# Patient Record
Sex: MALE | Employment: OTHER | URBAN - METROPOLITAN AREA
[De-identification: names, ages, dates, MRNs, and addresses within clinical notes are randomized per-mention and may not be internally consistent; named-entity substitution may affect disease eponyms.]

---

## 2019-06-09 ENCOUNTER — HOSPITAL ENCOUNTER (EMERGENCY)
Facility: HOSPITAL | Age: 59
Discharge: HOME | End: 2019-06-10
Attending: EMERGENCY MEDICINE
Payer: MEDICARE

## 2019-06-09 VITALS
DIASTOLIC BLOOD PRESSURE: 53 MMHG | HEIGHT: 68 IN | HEART RATE: 86 BPM | BODY MASS INDEX: 20.36 KG/M2 | TEMPERATURE: 99 F | SYSTOLIC BLOOD PRESSURE: 103 MMHG | OXYGEN SATURATION: 97 % | WEIGHT: 134.38 LBS | RESPIRATION RATE: 12 BRPM

## 2019-06-09 DIAGNOSIS — G89.29 CHRONIC MIDLINE LOW BACK PAIN WITHOUT SCIATICA: ICD-10-CM

## 2019-06-09 DIAGNOSIS — I95.9 HYPOTENSION: ICD-10-CM

## 2019-06-09 DIAGNOSIS — E86.0 DEHYDRATION: ICD-10-CM

## 2019-06-09 DIAGNOSIS — F10.920 ALCOHOLIC INTOXICATION WITHOUT COMPLICATION: Primary | ICD-10-CM

## 2019-06-09 DIAGNOSIS — R00.1 BRADYCARDIA: ICD-10-CM

## 2019-06-09 DIAGNOSIS — M54.50 CHRONIC MIDLINE LOW BACK PAIN WITHOUT SCIATICA: ICD-10-CM

## 2019-06-09 DIAGNOSIS — R93.89 ABNORMAL CT SCAN: ICD-10-CM

## 2019-06-09 LAB
ALBUMIN SERPL BCP-MCNC: 2.9 G/DL (ref 3.5–5.2)
ALP SERPL-CCNC: 68 U/L (ref 55–135)
ALT SERPL W/O P-5'-P-CCNC: 21 U/L (ref 10–44)
AMPHET+METHAMPHET UR QL: NEGATIVE
ANION GAP SERPL CALC-SCNC: 13 MMOL/L (ref 8–16)
APAP SERPL-MCNC: <3 UG/ML (ref 10–20)
AST SERPL-CCNC: 51 U/L (ref 10–40)
BARBITURATES UR QL SCN>200 NG/ML: NEGATIVE
BASOPHILS # BLD AUTO: 0.05 K/UL (ref 0–0.2)
BASOPHILS NFR BLD: 1.2 % (ref 0–1.9)
BENZODIAZ UR QL SCN>200 NG/ML: NEGATIVE
BILIRUB SERPL-MCNC: 0.3 MG/DL (ref 0.1–1)
BILIRUB UR QL STRIP: NEGATIVE
BNP SERPL-MCNC: 13 PG/ML (ref 0–99)
BUN SERPL-MCNC: 4 MG/DL (ref 6–20)
BZE UR QL SCN: NEGATIVE
CALCIUM SERPL-MCNC: 7.9 MG/DL (ref 8.7–10.5)
CANNABINOIDS UR QL SCN: NEGATIVE
CHLORIDE SERPL-SCNC: 107 MMOL/L (ref 95–110)
CLARITY UR: CLEAR
CO2 SERPL-SCNC: 21 MMOL/L (ref 23–29)
COLOR UR: YELLOW
CREAT SERPL-MCNC: 0.7 MG/DL (ref 0.5–1.4)
CREAT UR-MCNC: 30.5 MG/DL (ref 23–375)
CRP SERPL-MCNC: 0.2 MG/L (ref 0–8.2)
DIFFERENTIAL METHOD: ABNORMAL
EOSINOPHIL # BLD AUTO: 0.1 K/UL (ref 0–0.5)
EOSINOPHIL NFR BLD: 2.3 % (ref 0–8)
ERYTHROCYTE [DISTWIDTH] IN BLOOD BY AUTOMATED COUNT: 13.8 % (ref 11.5–14.5)
ERYTHROCYTE [SEDIMENTATION RATE] IN BLOOD BY WESTERGREN METHOD: 4 MM/HR (ref 0–10)
EST. GFR  (AFRICAN AMERICAN): >60 ML/MIN/1.73 M^2
EST. GFR  (NON AFRICAN AMERICAN): >60 ML/MIN/1.73 M^2
ETHANOL SERPL-MCNC: 280 MG/DL
GLUCOSE SERPL-MCNC: 118 MG/DL (ref 70–110)
GLUCOSE UR QL STRIP: NEGATIVE
HCT VFR BLD AUTO: 42.1 % (ref 40–54)
HGB BLD-MCNC: 14.4 G/DL (ref 14–18)
HGB UR QL STRIP: NEGATIVE
KETONES UR QL STRIP: NEGATIVE
LACTATE SERPL-SCNC: 2.1 MMOL/L (ref 0.5–2.2)
LEUKOCYTE ESTERASE UR QL STRIP: NEGATIVE
LIPASE SERPL-CCNC: 32 U/L (ref 4–60)
LYMPHOCYTES # BLD AUTO: 2.2 K/UL (ref 1–4.8)
LYMPHOCYTES NFR BLD: 50.3 % (ref 18–48)
MAGNESIUM SERPL-MCNC: 1.8 MG/DL (ref 1.6–2.6)
MCH RBC QN AUTO: 33.6 PG (ref 27–31)
MCHC RBC AUTO-ENTMCNC: 34.2 G/DL (ref 32–36)
MCV RBC AUTO: 98 FL (ref 82–98)
METHADONE UR QL SCN>300 NG/ML: NEGATIVE
MONOCYTES # BLD AUTO: 0.3 K/UL (ref 0.3–1)
MONOCYTES NFR BLD: 6.2 % (ref 4–15)
NEUTROPHILS # BLD AUTO: 1.7 K/UL (ref 1.8–7.7)
NEUTROPHILS NFR BLD: 40 % (ref 38–73)
NITRITE UR QL STRIP: NEGATIVE
OPIATES UR QL SCN: NEGATIVE
PCP UR QL SCN>25 NG/ML: NEGATIVE
PH UR STRIP: 6 [PH] (ref 5–8)
PLATELET # BLD AUTO: 134 K/UL (ref 150–350)
PMV BLD AUTO: 11.1 FL (ref 9.2–12.9)
POTASSIUM SERPL-SCNC: 3.5 MMOL/L (ref 3.5–5.1)
PROT SERPL-MCNC: 5.9 G/DL (ref 6–8.4)
PROT UR QL STRIP: NEGATIVE
RBC # BLD AUTO: 4.29 M/UL (ref 4.6–6.2)
SALICYLATES SERPL-MCNC: 15.5 MG/DL (ref 15–30)
SODIUM SERPL-SCNC: 141 MMOL/L (ref 136–145)
SP GR UR STRIP: <=1.005 (ref 1–1.03)
TOXICOLOGY INFORMATION: NORMAL
TROPONIN I SERPL DL<=0.01 NG/ML-MCNC: 0.02 NG/ML (ref 0–0.03)
TROPONIN I SERPL DL<=0.01 NG/ML-MCNC: 0.03 NG/ML (ref 0–0.03)
TSH SERPL DL<=0.005 MIU/L-ACNC: 0.66 UIU/ML (ref 0.4–4)
URN SPEC COLLECT METH UR: ABNORMAL
UROBILINOGEN UR STRIP-ACNC: NEGATIVE EU/DL
WBC # BLD AUTO: 4.33 K/UL (ref 3.9–12.7)

## 2019-06-09 PROCEDURE — 84484 ASSAY OF TROPONIN QUANT: CPT

## 2019-06-09 PROCEDURE — 84484 ASSAY OF TROPONIN QUANT: CPT | Mod: 91

## 2019-06-09 PROCEDURE — 81003 URINALYSIS AUTO W/O SCOPE: CPT | Mod: 59

## 2019-06-09 PROCEDURE — 93010 ELECTROCARDIOGRAM REPORT: CPT | Mod: ,,, | Performed by: INTERNAL MEDICINE

## 2019-06-09 PROCEDURE — 80307 DRUG TEST PRSMV CHEM ANLYZR: CPT

## 2019-06-09 PROCEDURE — 83605 ASSAY OF LACTIC ACID: CPT

## 2019-06-09 PROCEDURE — 85651 RBC SED RATE NONAUTOMATED: CPT

## 2019-06-09 PROCEDURE — 84443 ASSAY THYROID STIM HORMONE: CPT

## 2019-06-09 PROCEDURE — 83735 ASSAY OF MAGNESIUM: CPT

## 2019-06-09 PROCEDURE — 83880 ASSAY OF NATRIURETIC PEPTIDE: CPT

## 2019-06-09 PROCEDURE — 25000003 PHARM REV CODE 250: Performed by: EMERGENCY MEDICINE

## 2019-06-09 PROCEDURE — 86140 C-REACTIVE PROTEIN: CPT

## 2019-06-09 PROCEDURE — 83690 ASSAY OF LIPASE: CPT

## 2019-06-09 PROCEDURE — 80329 ANALGESICS NON-OPIOID 1 OR 2: CPT

## 2019-06-09 PROCEDURE — 93005 ELECTROCARDIOGRAM TRACING: CPT

## 2019-06-09 PROCEDURE — 93010 EKG 12-LEAD: ICD-10-PCS | Mod: ,,, | Performed by: INTERNAL MEDICINE

## 2019-06-09 PROCEDURE — 96361 HYDRATE IV INFUSION ADD-ON: CPT

## 2019-06-09 PROCEDURE — 80320 DRUG SCREEN QUANTALCOHOLS: CPT

## 2019-06-09 PROCEDURE — 80053 COMPREHEN METABOLIC PANEL: CPT

## 2019-06-09 PROCEDURE — 99285 EMERGENCY DEPT VISIT HI MDM: CPT | Mod: 25

## 2019-06-09 PROCEDURE — 36415 COLL VENOUS BLD VENIPUNCTURE: CPT

## 2019-06-09 PROCEDURE — 25500020 PHARM REV CODE 255: Performed by: EMERGENCY MEDICINE

## 2019-06-09 PROCEDURE — 85025 COMPLETE CBC W/AUTO DIFF WBC: CPT

## 2019-06-09 PROCEDURE — 96360 HYDRATION IV INFUSION INIT: CPT

## 2019-06-09 RX ORDER — PANTOPRAZOLE SODIUM 40 MG/1
40 TABLET, DELAYED RELEASE ORAL DAILY
COMMUNITY

## 2019-06-09 RX ORDER — THIAMINE HCL 100 MG
100 TABLET ORAL DAILY
Status: DISCONTINUED | OUTPATIENT
Start: 2019-06-10 | End: 2019-06-09

## 2019-06-09 RX ORDER — THIAMINE HCL 100 MG
100 TABLET ORAL DAILY
Status: DISCONTINUED | OUTPATIENT
Start: 2019-06-09 | End: 2019-06-10 | Stop reason: HOSPADM

## 2019-06-09 RX ORDER — TIZANIDINE 4 MG/1
4 TABLET ORAL EVERY 6 HOURS PRN
COMMUNITY

## 2019-06-09 RX ADMIN — SODIUM CHLORIDE, SODIUM LACTATE, POTASSIUM CHLORIDE, AND CALCIUM CHLORIDE 1000 ML: .6; .31; .03; .02 INJECTION, SOLUTION INTRAVENOUS at 05:06

## 2019-06-09 RX ADMIN — Medication 100 MG: at 07:06

## 2019-06-09 RX ADMIN — SODIUM CHLORIDE 1000 ML: 0.9 INJECTION, SOLUTION INTRAVENOUS at 03:06

## 2019-06-09 RX ADMIN — IOHEXOL 100 ML: 350 INJECTION, SOLUTION INTRAVENOUS at 05:06

## 2019-06-09 NOTE — ED NOTES
Pt resting comfortably in ED bed in no acute distress. Vital signs stable. Respirations even and unlabored. Bed in lowest

## 2019-06-09 NOTE — ED PROVIDER NOTES
SCRIBE #1 NOTE: I, Jaren Flores, am scribing for, and in the presence of, Gerard Ward MD. I have scribed the HPI, ROS, and PEx.     SCRIBE #2 NOTE: I, Ginger Chaney, am scribing for, and in the presence of,  Prem Pedro MD. I have scribed the remaining portions of the note not scribed by Scribe #1.     History      Chief Complaint   Patient presents with    Back Pain     back pain, hypotensive en route       Review of patient's allergies indicates:  No Known Allergies     HPI   HPI    6/9/2019, 2:34 PM   History obtained from the patient and [facility]      History of Present Illness: Hugh Suggs is a 59 y.o. male patient who presents to the Emergency Department for lower back pain, onset several years PTA. Symptoms are constant and moderate in severity. No mitigating or exacerbating factors reported. Associated sxs include gait problem. Patient denies any fever, chills, n/v, SOB, CP, weakness, numbness, dizziness, headache, and all other sxs at this time. Prior Tx includes Zanaflex, with no improvement. [facility] states that the pt had combined Zanaflex with several beers at home, and was hypotensive en route to the ED. No further complaints or concerns at this time.       Arrival mode: [facility]    PCP: SWAPNA Queen       Past Medical History:  History reviewed. No pertinent medical history.     Past Surgical History:  History reviewed. No pertinent surgical history.     Family History:  History reviewed. No pertinent family history.     Social History:  Social History     Tobacco Use    Smoking status: Unknown   Substance and Sexual Activity    Alcohol use: Unknown    Drug use: Unknown    Sexual activity: Unknown     ROS   Review of Systems   Constitutional: Negative for chills, diaphoresis, fatigue and fever.   HENT: Negative for sore throat.    Respiratory: Negative for shortness of breath.    Cardiovascular: Negative for chest pain.   Gastrointestinal: Negative for abdominal pain and nausea.    Genitourinary: Negative for dysuria.   Musculoskeletal: Positive for back pain (lower) and gait problem.   Skin: Negative for rash and wound.   Neurological: Negative for dizziness, weakness, light-headedness, numbness and headaches.   Hematological: Does not bruise/bleed easily.   All other systems reviewed and are negative.    Physical Exam      Initial Vitals   BP Pulse Resp Temp SpO2   06/09/19 1435 06/09/19 1435 06/09/19 1435 06/09/19 1441 06/09/19 1435   (!) 96/49 64 18 98.6 °F (37 °C) 98 %      MAP       --                 Physical Exam  Nursing Notes and Vital Signs Reviewed.  Constitutional: Patient is in no acute distress. Well-developed and well-nourished.  Head: Atraumatic. Normocephalic.  Eyes: PERRL. EOM intact. Conjunctivae are not pale. No scleral icterus.  ENT: Mucous membranes are moist. Oropharynx is clear and symmetric.    Neck: Supple. Full ROM. No lymphadenopathy.  Cardiovascular: Regular rate. Regular rhythm. No murmurs, rubs, or gallops. Distal pulses are 2+ and symmetric.  Pulmonary/Chest: No respiratory distress. Clear to auscultation bilaterally. No wheezing or rales.  Abdominal: Soft and non-distended.  There is no tenderness.  No rebound, guarding, or rigidity. Good bowel sounds.  Genitourinary: No CVA tenderness  Musculoskeletal: Moves all extremities. No obvious deformities. No edema. No calf tenderness.  Back: No midline bony tenderness, deformities, or step-offs of the T-spine or L-spine. Skin appears normal without abrasions or bruising. No erythema, induration, or fluctuance.   Skin: Warm and dry.  Neurological:  Alert, awake, and appropriate.  Normal speech.  No acute focal neurological deficits are appreciated.  Psychiatric: Normal affect. Good eye contact. Appropriate in content.    ED Course    Procedures  ED Vital Signs:  Vitals:    06/09/19 1454 06/09/19 1514 06/09/19 1515 06/09/19 1541   BP: (!) 84/49   (S) (!) 77/53   Pulse: 64   (!) 57   Resp: 17   14   Temp:      "  TempSrc:       SpO2: 96%   97%   Weight:  61 kg (134 lb 6.4 oz)     Height:   5' 8" (1.727 m)     06/09/19 1601 06/09/19 1616 06/09/19 1631 06/09/19 1727   BP: (!) 81/52 (!) 73/49 96/62    Pulse: (!) 59 64 (!) 56 (S) (!) 47   Resp: 18 19 17 16   Temp:       TempSrc:       SpO2: 98% (!) 94%  100%   Weight:       Height:        06/09/19 1731 06/09/19 1749 06/09/19 1801 06/09/19 1836   BP: 99/60 94/61 (!) 97/57 (!) 99/57   Pulse: (!) 46 (!) 56 (!) 52 (!) 55   Resp: 14  16 16   Temp:       TempSrc:       SpO2: 98% (!) 94% 97% 99%   Weight:       Height:        06/09/19 1931 06/09/19 2047 06/09/19 2048   BP: (!) 102/56 (!) 103/53    Pulse: (!) 51  86   Resp: 12     Temp:      TempSrc:      SpO2: 95% 97%    Weight:      Height:          Abnormal Lab Results:  Labs Reviewed   CBC W/ AUTO DIFFERENTIAL - Abnormal; Notable for the following components:       Result Value    RBC 4.29 (*)     Mean Corpuscular Hemoglobin 33.6 (*)     Platelets 134 (*)     Gran # (ANC) 1.7 (*)     Lymph% 50.3 (*)     All other components within normal limits   COMPREHENSIVE METABOLIC PANEL - Abnormal; Notable for the following components:    CO2 21 (*)     Glucose 118 (*)     BUN, Bld 4 (*)     Calcium 7.9 (*)     Total Protein 5.9 (*)     Albumin 2.9 (*)     AST 51 (*)     All other components within normal limits   URINALYSIS, REFLEX TO URINE CULTURE - Abnormal; Notable for the following components:    Specific Gravity, UA <=1.005 (*)     All other components within normal limits    Narrative:     Preferred Collection Type->Urine, Clean Catch   ALCOHOL,MEDICAL (ETHANOL) - Abnormal; Notable for the following components:    Alcohol, Medical, Serum 280 (*)     All other components within normal limits   ACETAMINOPHEN LEVEL - Abnormal; Notable for the following components:    Acetaminophen (Tylenol), Serum <3.0 (*)     All other components within normal limits   TROPONIN I - Abnormal; Notable for the following components:    Troponin I 0.028 (*)  "    All other components within normal limits   TSH   DRUG SCREEN PANEL, URINE EMERGENCY    Narrative:     Preferred Collection Type->Urine, Clean Catch   SALICYLATE LEVEL   LACTIC ACID, PLASMA   LIPASE   C-REACTIVE PROTEIN   SEDIMENTATION RATE   TROPONIN I   B-TYPE NATRIURETIC PEPTIDE   TSH   MAGNESIUM   SEDIMENTATION RATE   LIPASE   C-REACTIVE PROTEIN   MAGNESIUM   B-TYPE NATRIURETIC PEPTIDE   TROPONIN I        All Lab Results:  Results for orders placed or performed during the hospital encounter of 06/09/19   CBC auto differential   Result Value Ref Range    WBC 4.33 3.90 - 12.70 K/uL    RBC 4.29 (L) 4.60 - 6.20 M/uL    Hemoglobin 14.4 14.0 - 18.0 g/dL    Hematocrit 42.1 40.0 - 54.0 %    Mean Corpuscular Volume 98 82 - 98 fL    Mean Corpuscular Hemoglobin 33.6 (H) 27.0 - 31.0 pg    Mean Corpuscular Hemoglobin Conc 34.2 32.0 - 36.0 g/dL    RDW 13.8 11.5 - 14.5 %    Platelets 134 (L) 150 - 350 K/uL    MPV 11.1 9.2 - 12.9 fL    Gran # (ANC) 1.7 (L) 1.8 - 7.7 K/uL    Lymph # 2.2 1.0 - 4.8 K/uL    Mono # 0.3 0.3 - 1.0 K/uL    Eos # 0.1 0.0 - 0.5 K/uL    Baso # 0.05 0.00 - 0.20 K/uL    Gran% 40.0 38.0 - 73.0 %    Lymph% 50.3 (H) 18.0 - 48.0 %    Mono% 6.2 4.0 - 15.0 %    Eosinophil% 2.3 0.0 - 8.0 %    Basophil% 1.2 0.0 - 1.9 %    Differential Method Automated    Comprehensive metabolic panel   Result Value Ref Range    Sodium 141 136 - 145 mmol/L    Potassium 3.5 3.5 - 5.1 mmol/L    Chloride 107 95 - 110 mmol/L    CO2 21 (L) 23 - 29 mmol/L    Glucose 118 (H) 70 - 110 mg/dL    BUN, Bld 4 (L) 6 - 20 mg/dL    Creatinine 0.7 0.5 - 1.4 mg/dL    Calcium 7.9 (L) 8.7 - 10.5 mg/dL    Total Protein 5.9 (L) 6.0 - 8.4 g/dL    Albumin 2.9 (L) 3.5 - 5.2 g/dL    Total Bilirubin 0.3 0.1 - 1.0 mg/dL    Alkaline Phosphatase 68 55 - 135 U/L    AST 51 (H) 10 - 40 U/L    ALT 21 10 - 44 U/L    Anion Gap 13 8 - 16 mmol/L    eGFR if African American >60 >60 mL/min/1.73 m^2    eGFR if non African American >60 >60 mL/min/1.73 m^2   Urinalysis,  Reflex to Urine Culture Urine, Clean Catch   Result Value Ref Range    Specimen UA Urine, Clean Catch     Color, UA Yellow Yellow, Straw, Patricia    Appearance, UA Clear Clear    pH, UA 6.0 5.0 - 8.0    Specific Gravity, UA <=1.005 (A) 1.005 - 1.030    Protein, UA Negative Negative    Glucose, UA Negative Negative    Ketones, UA Negative Negative    Bilirubin (UA) Negative Negative    Occult Blood UA Negative Negative    Nitrite, UA Negative Negative    Urobilinogen, UA Negative <2.0 EU/dL    Leukocytes, UA Negative Negative   TSH   Result Value Ref Range    TSH 0.661 0.400 - 4.000 uIU/mL   Drug screen panel, emergency   Result Value Ref Range    Benzodiazepines Negative     Methadone metabolites Negative     Cocaine (Metab.) Negative     Opiate Scrn, Ur Negative     Barbiturate Screen, Ur Negative     Amphetamine Screen, Ur Negative     THC Negative     Phencyclidine Negative     Creatinine, Random Ur 30.5 23.0 - 375.0 mg/dL    Toxicology Information SEE COMMENT    Ethanol   Result Value Ref Range    Alcohol, Medical, Serum 280 (H) <10 mg/dL   Salicylate level   Result Value Ref Range    Salicylate Lvl 15.5 15.0 - 30.0 mg/dL   Acetaminophen level   Result Value Ref Range    Acetaminophen (Tylenol), Serum <3.0 (L) 10.0 - 20.0 ug/mL   Lactic acid, plasma   Result Value Ref Range    Lactate (Lactic Acid) 2.1 0.5 - 2.2 mmol/L   Sedimentation rate   Result Value Ref Range    Sed Rate 4 0 - 10 mm/Hr   Lipase   Result Value Ref Range    Lipase 32 4 - 60 U/L   C-reactive protein   Result Value Ref Range    CRP 0.2 0.0 - 8.2 mg/L   Magnesium   Result Value Ref Range    Magnesium 1.8 1.6 - 2.6 mg/dL   Troponin I   Result Value Ref Range    Troponin I 0.028 (H) 0.000 - 0.026 ng/mL   Brain natriuretic peptide   Result Value Ref Range    BNP 13 0 - 99 pg/mL   Troponin I   Result Value Ref Range    Troponin I 0.018 0.000 - 0.026 ng/mL       Imaging Results:  Imaging Results          US Abdomen Limited (Final result)  Result time  06/09/19 20:09:33    Final result by Virginia Yanez MD (06/09/19 20:09:33)                 Impression:      Cholelithiasis with a thickened gallbladder wall.  This can be seen with acute cholecystitis.  Please clinically correlate.    Fatty liver.      Electronically signed by: Virginia Yanez  Date:    06/09/2019  Time:    20:09             Narrative:    EXAMINATION:  US ABDOMEN LIMITED    CLINICAL HISTORY:  abnormal CT scan;    TECHNIQUE:  Limited ultrasound of the right upper quadrant of the abdomen (including pancreas, liver, gallbladder, common bile duct, and spleen) was performed.    COMPARISON:  None.    FINDINGS:  Liver: Normal in size. Increased homogeneous echotexture no focal hepatic lesions.    Gallbladder: The gallbladder contains stones and sludge.  Its wall is mildly thickened to 4 mm.    Biliary system: The common duct is not dilated, measuring 2 mm.  No intrahepatic ductal dilatation.    Miscellaneous: There is mild perihepatic ascites.                               CTA Abdomen (Final result)  Result time 06/09/19 18:07:56    Final result by Virginia Yanez MD (06/09/19 18:07:56)                 Impression:      Distended gallbladder with a questionable thickened wall and pericholecystic inflammatory change.  Further evaluation with ultrasound is recommended.    No evidence of aortic aneurysm or dissection.    Right adrenal nodule statistically likely represents a benign adenoma.    All CT scans at this facility use dose modulation, iterative reconstruction and/or weight based dosing when appropriate to reduce radiation dose to as low as reasonably achievable.      Electronically signed by: Virginia Yanez  Date:    06/09/2019  Time:    18:07             Narrative:    EXAMINATION:  CTA ABDOMEN    CLINICAL HISTORY:  Abd mass, pulsatile, AAA suspected;back pain and hypotension;    TECHNIQUE:  Technique: Axial CT imaging was performed through the abdomen with following  intravenous contrast. Multiplanar reformats were performed and interpreted.  3D reconstructed MIPS images were also created on    COMPARISON:  None    FINDINGS:  Lung bases are clear.    There is no evidence of abdominal aneurysm or dissection.  Aortic branches are patent with no areas of significant stenosis.    There is a 1.5 cm round right adrenal mass.  The liver, spleen, kidneys, adrenal glands, and pancreas are normal. The gallbladder is distended with an enhancing wall that measures approximately 5 mm.  There are inflammatory changes surrounding the gallbladder.No free fluid, free air, or inflammatory change.  The bowel is nondistended and within normal limits.The abdominal aorta is normal in caliber. The urinary bladder is unremarkable. No significant osseous abnormality is identified.                               X-Ray Chest AP Portable (Final result)  Result time 06/09/19 17:52:03    Final result by Virginia Yanez MD (06/09/19 17:52:03)                 Impression:      No cardiopulmonary process noted.      Electronically signed by: Virginia Yanez  Date:    06/09/2019  Time:    17:52             Narrative:    EXAMINATION:  XR CHEST AP PORTABLE    CLINICAL HISTORY:  Hypotension, unspecified    COMPARISON:  No priors    FINDINGS:  No infiltrate or effusion identified.  Cardiomediastinal silhouette is within normal limits.                               X-Ray Lumbar Spine Ap And Lateral (Final result)  Result time 06/09/19 15:14:55    Final result by Virginia Yanez MD (06/09/19 15:14:55)                 Impression:      Unremarkable exam.      Electronically signed by: Virginia Yanez  Date:    06/09/2019  Time:    15:14             Narrative:    EXAMINATION:  XR LUMBAR SPINE AP AND LATERAL    CLINICAL HISTORY:  Low back pain, >6wks conservative tx, persistent-progressive sx, surgical candidate;    COMPARISON:  None    FINDINGS:  Alignment is normal.  Vertebral body heights and  disc spaces re preserved.  Pedicles have a normal, symmetric appearance.                                    The EKG was ordered, reviewed, and independently interpreted by the ED provider.  Interpretation time: 1730  Rate: 47 BPM  Rhythm: sinus bradycardia  Interpretation: Prolonged QT. No STEMI.      The Emergency Provider reviewed the vital signs and test results, which are outlined above.    ED Discussion     4:02 PM: Dr. Ward transfers care of pt to Dr. Pedro pending lab results.    8:43 PM: Reassessed pt at this time. Pt's BP has improved. Discussed with pt all pertinent ED information and results. Discussed pt dx and plan of tx. Gave pt all f/u and return to the ED instructions. All questions and concerns were addressed at this time. Pt expresses understanding of information and instructions, and is comfortable with plan to discharge. Pt is stable for discharge.    I discussed with patient and/or family/caretaker that evaluation in the ED does not suggest any emergent or life threatening medical conditions requiring immediate intervention beyond what was provided in the ED, and I believe patient is safe for discharge.  Regardless, an unremarkable evaluation in the ED does not preclude the development or presence of a serious of life threatening condition. As such, patient was instructed to return immediately for any worsening or change in current symptoms.      ED Medication(s):  Medications   thiamine tablet 100 mg (100 mg Oral Given 6/9/19 1947)   sodium chloride 0.9% bolus 1,000 mL (0 mLs Intravenous Stopped 6/9/19 1604)   sodium chloride 0.9% bolus 1,000 mL (0 mLs Intravenous Stopped 6/9/19 1604)   lactated ringers bolus 1,000 mL (0 mLs Intravenous Stopped 6/9/19 1754)   iohexol (OMNIPAQUE 350) injection 100 mL (100 mLs Intravenous Given 6/9/19 1747)       Follow-up Information     SWAPNA Queen In 2 days.    Specialty:  Family Medicine  Contact information:  [address]  [address]  [place]  FAMILY MEDICINE & AFTER HOURS  [facility] [address]  [phone]             [facility] - .    Specialty:  Emergency Medicine  Why:  As needed, If symptoms worsen  Contact information:  [address]  [facility] [address] [phone]                New Prescriptions    No medications on file           Medical Decision Making    Medical Decision Making:   Clinical Tests:   Lab Tests: Ordered and Reviewed  Radiological Study: Ordered and Reviewed  Medical Tests: Ordered and Reviewed  Patient is a 59-year-old male who drinks alcohol daily, who called EMS for low back pain. Patient states that he has had low back pain for years that seems to have progressively worsened over the past week prompting his call to EMS today.  He was noted to be hypotensive.  He does report that he did feel lightheaded and generalized weakness today as well. Given his complaint of hypertension and back pain, I performed a bedside ultrasound which did not suggest AAA.  Patient was given IV fluids, however patient's blood pressure remained hypotensive after early IV fluid resuscitation, and therefore a CT abdomen was ordered to definitively rule out AAA.  No evidence of AAA on CT scan.  Patient's blood pressure ultimately did improve with just IV fluids.  No evidence of infection.  Imaging was concerned about possible cholecystitis, however this did not correlate clinically at all.  Patient had no right upper quadrant pain or tenderness and had no nausea/vomiting.  Patient's back pain was midline lumbar.  He is currently being treated by his primary care physician for the back pain. Normal sed rate and CRP arguing against any potential epidural abscess.  Advised that he follow back up with his primary care physician.  He declined wanting any pain medication prior to discharge stating that he does not like to take medication unless he has to.  Patient advised to drink plenty of fluids and warned that  excessive alcohol consumption can lead to dehydration.  Initial troponin was slightly elevated without any complaint of chest pain or shortness of breath.  Repeat troponin within normal limits.  Do not suspect ACS.  Patient discharged home in stable condition with a ride.           Scribe Attestation:   Scribe #1: I performed the above scribed service and the documentation accurately describes the services I performed. I attest to the accuracy of the note.    Attending:   Physician Attestation Statement for Scribe #1: I, Gerard Ward MD, personally performed the services described in this documentation, as scribed by Jaren Flores, in my presence, and it is both accurate and complete.       Scribe Attestation:   Scribe #2: I performed the above scribed service and the documentation accurately describes the services I performed. I attest to the accuracy of the note.    Attending Attestation:           Physician Attestation for Scribe:    Physician Attestation Statement for Scribe #2: I, Prem Pedro MD, reviewed documentation, as scribed by Ginger Chaney in my presence, and it is both accurate and complete. I also acknowledge and confirm the content of the note done by Scribe #1.          Clinical Impression       ICD-10-CM ICD-9-CM   1. Alcoholic intoxication without complication F10.920 305.00   2. Hypotension I95.9 458.9   3. Bradycardia R00.1 427.89   4. Abnormal CT scan R93.89 793.99   5. Chronic midline low back pain without sciatica M54.5 724.2    G89.29 338.29   6. Dehydration E86.0 276.51       Disposition:   Disposition: Discharged  Condition: Stable         Prem Pedro MD  06/09/19 [id number]

## 2019-06-10 NOTE — ED NOTES
[facility] ended 2015.

## 2019-06-10 NOTE — ED NOTES
Pt resting in ED bed in no acute distress. Vital signs stable. Respirations even and unlabored. Bed in lowest position. Urinal and call bell at bedside. Will continue to monitor.

## 2019-12-13 ENCOUNTER — HOSPITAL ENCOUNTER (EMERGENCY)
Facility: HOSPITAL | Age: 59
Discharge: HOME | End: 2019-12-14
Attending: FAMILY MEDICINE
Payer: MEDICARE

## 2019-12-13 DIAGNOSIS — M25.519 SHOULDER PAIN: Primary | ICD-10-CM

## 2019-12-13 LAB
HCV AB SERPL QL IA: NEGATIVE
HIV 1+2 AB+HIV1 P24 AG SERPL QL IA: NEGATIVE

## 2019-12-13 PROCEDURE — 86803 HEPATITIS C AB TEST: CPT | Mod: HCWC

## 2019-12-13 PROCEDURE — 99284 EMERGENCY DEPT VISIT MOD MDM: CPT | Mod: 25,HCWC

## 2019-12-13 PROCEDURE — 36415 COLL VENOUS BLD VENIPUNCTURE: CPT | Mod: HCWC

## 2019-12-13 PROCEDURE — 86703 HIV-1/HIV-2 1 RESULT ANTBDY: CPT | Mod: HCWC

## 2019-12-14 VITALS
HEART RATE: 83 BPM | BODY MASS INDEX: 19.38 KG/M2 | WEIGHT: 127.88 LBS | TEMPERATURE: 98 F | SYSTOLIC BLOOD PRESSURE: 106 MMHG | DIASTOLIC BLOOD PRESSURE: 56 MMHG | RESPIRATION RATE: 20 BRPM | OXYGEN SATURATION: 97 % | HEIGHT: 68 IN

## 2019-12-14 NOTE — ED NOTES
Patient reports he does not want to contact family/friends for transportation. Charge nurse notified. Will set up for transportation.
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
## 2019-12-14 NOTE — ED NOTES
Pt asleep in stretcher, in NAD, Resp e/u. Stretcher locked in lowest position. Side rails up x2. Call bell within reach. Will continue to monitor.

## 2019-12-14 NOTE — ED NOTES
Patient identifiers verified and correct for Hugh Suggs.    LOC: The patient is awake, alert and aware of environment with an appropriate affect, the patient is oriented x 3 and speaking appropriately.  APPEARANCE: Patient resting comfortably and in no acute distress, patient is unkempt, patient's clothing is completely soiled.   SKIN: The skin is warm and dry, color consistent with ethnicity, patient has normal skin turgor and moist mucus membranes. Abrasion noted to R elbow and L shin. Bleeding controlled.   MUSCULOSKELETAL: Patient moving all extremities spontaneously. Reports R shoulder pain. No deformity noted. Walks with assistance from cane. Unsteady gait at this time.   RESPIRATORY: Airway is open and patent, respirations are spontaneous.  CARDIAC: Patient has a normal rate, no periphreal edema noted, capillary refill < 3 seconds.  ABDOMEN: Soft and non tender to palpation.

## 2020-09-07 ENCOUNTER — HOSPITAL ENCOUNTER (EMERGENCY)
Facility: HOSPITAL | Age: 60
Discharge: HOME | End: 2020-09-08
Attending: EMERGENCY MEDICINE
Payer: MEDICARE

## 2020-09-07 DIAGNOSIS — Z78.9 ALCOHOL USE: Primary | ICD-10-CM

## 2020-09-07 DIAGNOSIS — J32.9 CHRONIC SINUSITIS, UNSPECIFIED LOCATION: ICD-10-CM

## 2020-09-07 LAB
ALBUMIN SERPL BCP-MCNC: 3.7 G/DL (ref 3.5–5.2)
ALP SERPL-CCNC: 108 U/L (ref 55–135)
ALT SERPL W/O P-5'-P-CCNC: 43 U/L (ref 10–44)
ANION GAP SERPL CALC-SCNC: 20 MMOL/L (ref 8–16)
AST SERPL-CCNC: 110 U/L (ref 10–40)
BASOPHILS # BLD AUTO: 0.11 K/UL (ref 0–0.2)
BASOPHILS NFR BLD: 2.4 % (ref 0–1.9)
BILIRUB SERPL-MCNC: 0.8 MG/DL (ref 0.1–1)
BUN SERPL-MCNC: 3 MG/DL (ref 8–23)
CALCIUM SERPL-MCNC: 8.8 MG/DL (ref 8.7–10.5)
CHLORIDE SERPL-SCNC: 96 MMOL/L (ref 95–110)
CO2 SERPL-SCNC: 21 MMOL/L (ref 23–29)
CREAT SERPL-MCNC: 0.7 MG/DL (ref 0.5–1.4)
DIFFERENTIAL METHOD: ABNORMAL
EOSINOPHIL # BLD AUTO: 0.2 K/UL (ref 0–0.5)
EOSINOPHIL NFR BLD: 3.3 % (ref 0–8)
ERYTHROCYTE [DISTWIDTH] IN BLOOD BY AUTOMATED COUNT: 12 % (ref 11.5–14.5)
EST. GFR  (AFRICAN AMERICAN): >60 ML/MIN/1.73 M^2
EST. GFR  (NON AFRICAN AMERICAN): >60 ML/MIN/1.73 M^2
ETHANOL SERPL-MCNC: 388 MG/DL
GLUCOSE SERPL-MCNC: 97 MG/DL (ref 70–110)
HCT VFR BLD AUTO: 52 % (ref 40–54)
HCV AB SERPL QL IA: NEGATIVE
HGB BLD-MCNC: 17.7 G/DL (ref 14–18)
HIV 1+2 AB+HIV1 P24 AG SERPL QL IA: NEGATIVE
IMM GRANULOCYTES # BLD AUTO: 0 K/UL (ref 0–0.04)
IMM GRANULOCYTES NFR BLD AUTO: 0 % (ref 0–0.5)
LIPASE SERPL-CCNC: 43 U/L (ref 4–60)
LYMPHOCYTES # BLD AUTO: 2.5 K/UL (ref 1–4.8)
LYMPHOCYTES NFR BLD: 53.4 % (ref 18–48)
MCH RBC QN AUTO: 33.5 PG (ref 27–31)
MCHC RBC AUTO-ENTMCNC: 34 G/DL (ref 32–36)
MCV RBC AUTO: 99 FL (ref 82–98)
MONOCYTES # BLD AUTO: 0.4 K/UL (ref 0.3–1)
MONOCYTES NFR BLD: 9.6 % (ref 4–15)
NEUTROPHILS # BLD AUTO: 1.4 K/UL (ref 1.8–7.7)
NEUTROPHILS NFR BLD: 31.3 % (ref 38–73)
NRBC BLD-RTO: 0 /100 WBC
PLATELET # BLD AUTO: 146 K/UL (ref 150–350)
PMV BLD AUTO: 11 FL (ref 9.2–12.9)
POTASSIUM SERPL-SCNC: 3.9 MMOL/L (ref 3.5–5.1)
PROT SERPL-MCNC: 8.5 G/DL (ref 6–8.4)
RBC # BLD AUTO: 5.28 M/UL (ref 4.6–6.2)
SODIUM SERPL-SCNC: 137 MMOL/L (ref 136–145)
WBC # BLD AUTO: 4.59 K/UL (ref 3.9–12.7)

## 2020-09-07 PROCEDURE — 85025 COMPLETE CBC W/AUTO DIFF WBC: CPT | Mod: HCWC

## 2020-09-07 PROCEDURE — 80320 DRUG SCREEN QUANTALCOHOLS: CPT | Mod: HCWC

## 2020-09-07 PROCEDURE — 99284 EMERGENCY DEPT VISIT MOD MDM: CPT | Mod: 25,HCWC

## 2020-09-07 PROCEDURE — 25000003 PHARM REV CODE 250: Mod: HCWC | Performed by: EMERGENCY MEDICINE

## 2020-09-07 PROCEDURE — 96360 HYDRATION IV INFUSION INIT: CPT | Mod: HCWC

## 2020-09-07 PROCEDURE — 36415 COLL VENOUS BLD VENIPUNCTURE: CPT | Mod: HCWC

## 2020-09-07 PROCEDURE — 83690 ASSAY OF LIPASE: CPT | Mod: HCWC

## 2020-09-07 PROCEDURE — 80053 COMPREHEN METABOLIC PANEL: CPT | Mod: HCWC

## 2020-09-07 PROCEDURE — 86703 HIV-1/HIV-2 1 RESULT ANTBDY: CPT | Mod: HCWC

## 2020-09-07 PROCEDURE — 86803 HEPATITIS C AB TEST: CPT | Mod: HCWC

## 2020-09-07 RX ADMIN — SODIUM CHLORIDE 500 ML: 0.9 INJECTION, SOLUTION INTRAVENOUS at 11:09

## 2020-09-08 VITALS
DIASTOLIC BLOOD PRESSURE: 65 MMHG | BODY MASS INDEX: 20.52 KG/M2 | SYSTOLIC BLOOD PRESSURE: 102 MMHG | HEART RATE: 86 BPM | OXYGEN SATURATION: 99 % | TEMPERATURE: 98 F | RESPIRATION RATE: 14 BRPM | HEIGHT: 68 IN | WEIGHT: 135.38 LBS

## 2020-09-08 NOTE — ED PROVIDER NOTES
"SCRIBE #1 NOTE: I, Phyllis Hayden, am scribing for, and in the presence of, Prem Pedro MD. I have scribed the entire note.       History     Chief Complaint   Patient presents with    Vomiting     Family reports multiple episodes of vomiting today.  Hx of Cirrhosis.     Review of patient's allergies indicates:  No Known Allergies      History of Present Illness     HPI    9/7/2020, 10:07 PM  History obtained from the patient      History of Present Illness: Hugh Suggs is a 60 y.o. male patient with PMHx of cirrhosis who presents to the Emergency Department for evaluation. Pt states he called and told sister-in-law he had vomited today. Pt reports he would not be at ED if his sister-in-law did not make him. Upon further questioning, pt reports his vomiting is him coughing up sinus mucous, "it's just sinuses, that's all it is", and denies vomiting stomach contents. Pt reports sinus sxs are chronic. Symptoms are episodic and moderate in severity. No mitigating or exacerbating factors reported. Associated sxs not reported. Patient denies any fever, chills, SOB, abd distension, hematemesis, hemoptysis, blood in stool, abd pain, n/v/d, CP, HA,  hemoptysis, hematemesis, and all other sxs at this time. No prior Tx included. No further complaints or concerns at this time.       Arrival mode: EMS     PCP: Court Swift, APRN-CNP        Past Medical History:  History reviewed. No pertinent medical history.    Past Surgical History:  History reviewed. No pertinent surgical history.    Family History:  History reviewed. No pertinent family history.    Social History:  Social History Main Topics    Smoking status: Unknown if ever smoked    Smokeless tobacco: Unknown if ever used    Alcohol Use: Unknown drinking history    Drug Use: Unknown if ever used    Sexual Activity: Unknown        Review of Systems     Review of Systems   Constitutional: Negative for chills and fever.   HENT: Negative for sore throat.    Respiratory: " Positive for cough (productive). Negative for shortness of breath.         (-) hemoptysis   Cardiovascular: Negative for chest pain.   Gastrointestinal: Negative for abdominal distention, abdominal pain, blood in stool, diarrhea, nausea and vomiting.   Genitourinary: Negative for dysuria.        (-) hematemesis   Musculoskeletal: Negative for back pain.   Skin: Negative for rash.   Neurological: Negative for weakness and headaches.   Hematological: Does not bruise/bleed easily.   All other systems reviewed and are negative.       Physical Exam     Initial Vitals [09/07/20 2140]   BP Pulse Resp Temp SpO2   121/70 82 20 98.8 °F (37.1 °C) 98 %      MAP       --          Physical Exam  Nursing Notes and Vital Signs Reviewed.  Constitutional: Patient is in no acute distress.   Head: Atraumatic. Normocephalic.  Eyes: PERRL. EOM intact. Conjunctivae are not pale. No scleral icterus.  ENT: Mucous membranes are moist. No sinus TTP.  Neck: Supple. Full ROM. No lymphadenopathy.  Cardiovascular: Regular rate. Regular rhythm. No murmurs, rubs, or gallops. Distal pulses are 2+ and symmetric.  Pulmonary/Chest: No respiratory distress. Clear to auscultation bilaterally. No wheezing or rales.  Abdominal: Soft.  There is no tenderness.  No rebound, guarding, or rigidity. Good bowel sounds.  Genitourinary: No CVA tenderness  Musculoskeletal: Moves all extremities. No obvious deformities. No edema. No calf tenderness.  Skin: Warm and dry.  Neurological:  Alert, awake, and appropriate.  Normal speech.  No acute focal neurological deficits are appreciated. GCS 15.  Psychiatric: Normal affect. Good eye contact. Appropriate in content.     ED Course   Procedures  ED Vital Signs:  Vitals:    09/07/20 2140 09/07/20 2145 09/07/20 2200 09/07/20 2300   BP: 121/70 126/72 132/80 108/66   Pulse: 82 86 98 87   Resp: 20  15    Temp: 98.8 °F (37.1 °C)      TempSrc: Oral      SpO2: 98% 97% 98% (!) 91%   Weight: 61.4 kg (135 lb 5.8 oz)      Height: 5'  "8" (1.727 m)       09/07/20 2330 09/08/20 0030   BP: 110/65 102/65   Pulse: 88 86   Resp:  14   Temp:  97.9 °F (36.6 °C)   TempSrc:  Oral   SpO2: 95% 99%   Weight:     Height:         Abnormal Lab Results:  Labs Reviewed   CBC W/ AUTO DIFFERENTIAL - Abnormal; Notable for the following components:       Result Value    Mean Corpuscular Volume 99 (*)     Mean Corpuscular Hemoglobin 33.5 (*)     Platelets 146 (*)     Gran # (ANC) 1.4 (*)     Gran% 31.3 (*)     Lymph% 53.4 (*)     Basophil% 2.4 (*)     All other components within normal limits   COMPREHENSIVE METABOLIC PANEL - Abnormal; Notable for the following components:    CO2 21 (*)     BUN, Bld 3 (*)     Total Protein 8.5 (*)      (*)     Anion Gap 20 (*)     All other components within normal limits   ALCOHOL,MEDICAL (ETHANOL) - Abnormal; Notable for the following components:    Alcohol, Medical, Serum 388 (*)     All other components within normal limits    Narrative:     ALC critical result(s) called and verbal readback obtained from   Pily Lin RN by KB3 09/07/2020 22:49   HIV 1 / 2 ANTIBODY   HEPATITIS C ANTIBODY   LIPASE        All Lab Results:  Results for orders placed or performed during the hospital encounter of 09/07/20   HIV 1/2 Ag/Ab (4th Gen)   Result Value Ref Range    HIV 1/2 Ag/Ab Negative Negative   Hepatitis C antibody   Result Value Ref Range    Hepatitis C Ab Negative Negative   CBC auto differential   Result Value Ref Range    WBC 4.59 3.90 - 12.70 K/uL    RBC 5.28 4.60 - 6.20 M/uL    Hemoglobin 17.7 14.0 - 18.0 g/dL    Hematocrit 52.0 40.0 - 54.0 %    Mean Corpuscular Volume 99 (H) 82 - 98 fL    Mean Corpuscular Hemoglobin 33.5 (H) 27.0 - 31.0 pg    Mean Corpuscular Hemoglobin Conc 34.0 32.0 - 36.0 g/dL    RDW 12.0 11.5 - 14.5 %    Platelets 146 (L) 150 - 350 K/uL    MPV 11.0 9.2 - 12.9 fL    Immature Granulocytes 0.0 0.0 - 0.5 %    Gran # (ANC) 1.4 (L) 1.8 - 7.7 K/uL    Immature Grans (Abs) 0.00 0.00 - 0.04 K/uL    Lymph # " 2.5 1.0 - 4.8 K/uL    Mono # 0.4 0.3 - 1.0 K/uL    Eos # 0.2 0.0 - 0.5 K/uL    Baso # 0.11 0.00 - 0.20 K/uL    nRBC 0 0 /100 WBC    Gran% 31.3 (L) 38.0 - 73.0 %    Lymph% 53.4 (H) 18.0 - 48.0 %    Mono% 9.6 4.0 - 15.0 %    Eosinophil% 3.3 0.0 - 8.0 %    Basophil% 2.4 (H) 0.0 - 1.9 %    Differential Method Automated    Comprehensive metabolic panel   Result Value Ref Range    Sodium 137 136 - 145 mmol/L    Potassium 3.9 3.5 - 5.1 mmol/L    Chloride 96 95 - 110 mmol/L    CO2 21 (L) 23 - 29 mmol/L    Glucose 97 70 - 110 mg/dL    BUN, Bld 3 (L) 8 - 23 mg/dL    Creatinine 0.7 0.5 - 1.4 mg/dL    Calcium 8.8 8.7 - 10.5 mg/dL    Total Protein 8.5 (H) 6.0 - 8.4 g/dL    Albumin 3.7 3.5 - 5.2 g/dL    Total Bilirubin 0.8 0.1 - 1.0 mg/dL    Alkaline Phosphatase 108 55 - 135 U/L     (H) 10 - 40 U/L    ALT 43 10 - 44 U/L    Anion Gap 20 (H) 8 - 16 mmol/L    eGFR if African American >60 >60 mL/min/1.73 m^2    eGFR if non African American >60 >60 mL/min/1.73 m^2   Lipase   Result Value Ref Range    Lipase 43 4 - 60 U/L   Ethanol   Result Value Ref Range    Alcohol, Medical, Serum 388 (HH) <10 mg/dL                The Emergency Provider reviewed the vital signs and test results, which are outlined above.     ED Discussion     12:42 AM: Reassessed pt at this time. Discussed with pt all pertinent ED information and results. Discussed pt dx and plan of tx. Gave pt all f/u and return to the ED instructions. All questions and concerns were addressed at this time. Pt expresses understanding of information and instructions, and is comfortable with plan to discharge. Pt is stable for discharge.    I discussed with patient and/or family/caretaker that evaluation in the ED does not suggest any emergent or life threatening medical conditions requiring immediate intervention beyond what was provided in the ED, and I believe patient is safe for discharge.  Regardless, an unremarkable evaluation in the ED does not preclude the development  or presence of a serious of life threatening condition. As such, patient was instructed to return immediately for any worsening or change in current symptoms.         Medical Decision Making:   Clinical Tests:   Lab Tests: Ordered and Reviewed           ED Medication(s):  Medications   sodium chloride 0.9% bolus 500 mL (0 mLs Intravenous Stopped 9/8/20 0105)       Discharge Medication List as of 9/8/2020 12:42 AM          Follow-up Information     Call  SWAPNA Queen.    Specialty: Family Medicine  Contact information:  [id number]   [address]  [facility] MEDICINE & AFTER HOURS  [facility] [address]  [phone]             [facility] - .    Specialty: Emergency Medicine  Why: As needed, If symptoms worsen  Contact information:  [address]  [facility] [address] [phone]                     Scribe Attestation:   Scribe #1: I performed the above scribed service and the documentation accurately describes the services I performed. I attest to the accuracy of the note.     Attending:   Physician Attestation Statement for Scribe #1: I, Prem Pedro MD, personally performed the services described in this documentation, as scribed by Phyllis Hayden, in my presence, and it is both accurate and complete.           Clinical Impression       ICD-10-CM ICD-9-CM   1. Alcohol use  Z72.89 V49.89   2. Chronic sinusitis, unspecified location  J32.9 473.9       Disposition:   Disposition: Discharged  Condition: Stable         Prem Pedro MD  09/10/20 0608